# Patient Record
Sex: MALE | Race: WHITE | Employment: FULL TIME | URBAN - METROPOLITAN AREA
[De-identification: names, ages, dates, MRNs, and addresses within clinical notes are randomized per-mention and may not be internally consistent; named-entity substitution may affect disease eponyms.]

---

## 2021-04-08 ENCOUNTER — TRANSCRIBE ORDER (OUTPATIENT)
Dept: SCHEDULING | Age: 66
End: 2021-04-08

## 2021-04-08 DIAGNOSIS — M25.531 RIGHT WRIST PAIN: Primary | ICD-10-CM

## 2021-04-23 ENCOUNTER — HOSPITAL ENCOUNTER (OUTPATIENT)
Dept: MRI IMAGING | Age: 66
Discharge: HOME | End: 2021-04-23
Attending: SURGERY
Payer: MEDICARE

## 2021-04-23 DIAGNOSIS — M25.531 RIGHT WRIST PAIN: ICD-10-CM

## 2021-04-23 PROCEDURE — 73221 MRI JOINT UPR EXTREM W/O DYE: CPT

## 2021-06-10 ENCOUNTER — TRANSCRIBE ORDER (OUTPATIENT)
Dept: SCHEDULING | Age: 66
End: 2021-06-10

## 2021-06-10 DIAGNOSIS — M51.16 LUMBAR DISC DISEASE WITH RADICULOPATHY: ICD-10-CM

## 2021-06-10 DIAGNOSIS — Z98.1 ARTHRODESIS STATUS: Primary | ICD-10-CM

## 2021-06-10 DIAGNOSIS — M99.03 SOMATIC DYSFUNCTION OF LUMBAR REGION: ICD-10-CM

## 2021-06-10 DIAGNOSIS — M47.26 OTHER SPONDYLOSIS WITH RADICULOPATHY, LUMBAR REGION: ICD-10-CM

## 2021-06-10 DIAGNOSIS — M99.63 OSSEOUS AND SUBLUXATION STENOSIS OF INTERVERTEBRAL FORAMINA OF LUMBAR REGION: ICD-10-CM

## 2021-06-10 DIAGNOSIS — M62.830 BACK MUSCLE SPASM: ICD-10-CM

## 2021-06-15 ENCOUNTER — HOSPITAL ENCOUNTER (OUTPATIENT)
Dept: MRI IMAGING | Age: 66
Discharge: HOME | End: 2021-06-15
Payer: MEDICARE

## 2021-06-15 DIAGNOSIS — M51.16 LUMBAR DISC DISEASE WITH RADICULOPATHY: ICD-10-CM

## 2021-06-15 DIAGNOSIS — M99.63 OSSEOUS AND SUBLUXATION STENOSIS OF INTERVERTEBRAL FORAMINA OF LUMBAR REGION: ICD-10-CM

## 2021-06-15 DIAGNOSIS — M47.26 OTHER SPONDYLOSIS WITH RADICULOPATHY, LUMBAR REGION: ICD-10-CM

## 2021-06-15 DIAGNOSIS — M99.03 SOMATIC DYSFUNCTION OF LUMBAR REGION: ICD-10-CM

## 2021-06-15 DIAGNOSIS — Z98.1 ARTHRODESIS STATUS: ICD-10-CM

## 2021-06-15 DIAGNOSIS — M62.830 BACK MUSCLE SPASM: ICD-10-CM

## 2021-06-15 PROCEDURE — 72158 MRI LUMBAR SPINE W/O & W/DYE: CPT | Performed by: GENERAL PRACTICE

## 2021-06-15 RX ORDER — GADOTERATE MEGLUMINE 376.9 MG/ML
20 INJECTION INTRAVENOUS
Status: COMPLETED | OUTPATIENT
Start: 2021-06-15 | End: 2021-06-15

## 2021-06-15 RX ADMIN — GADOTERATE MEGLUMINE 20 ML: 376.9 INJECTION INTRAVENOUS at 09:00

## 2021-10-25 ENCOUNTER — TRANSCRIBE ORDER (OUTPATIENT)
Dept: SCHEDULING | Age: 66
End: 2021-10-25

## 2021-10-25 DIAGNOSIS — K11.8 SALIVARY GLAND OBSTRUCTION: Primary | ICD-10-CM

## 2021-11-04 ENCOUNTER — HOSPITAL ENCOUNTER (OUTPATIENT)
Dept: CT IMAGING | Age: 66
Discharge: HOME | End: 2021-11-04
Payer: MEDICARE

## 2021-11-04 DIAGNOSIS — K11.8 SALIVARY GLAND OBSTRUCTION: ICD-10-CM

## 2021-11-04 LAB — CREAT BLD-MCNC: 0.8 MG/DL (ref 0.6–1.3)

## 2021-11-04 PROCEDURE — 82565 ASSAY OF CREATININE: CPT

## 2021-11-04 PROCEDURE — 70491 CT SOFT TISSUE NECK W/DYE: CPT | Performed by: STUDENT IN AN ORGANIZED HEALTH CARE EDUCATION/TRAINING PROGRAM

## 2021-11-16 DIAGNOSIS — S46.012D TRAUMATIC ROTATOR CUFF TEAR, LEFT, SUBSEQUENT ENCOUNTER: Primary | ICD-10-CM

## 2021-11-20 DIAGNOSIS — S46.012D TRAUMATIC ROTATOR CUFF TEAR, LEFT, SUBSEQUENT ENCOUNTER: ICD-10-CM

## 2021-12-03 ENCOUNTER — OFFICE VISIT (OUTPATIENT)
Dept: ORTHOPEDIC SURGERY | Age: 66
End: 2021-12-03
Payer: MEDICARE

## 2021-12-03 VITALS — BODY MASS INDEX: 28.23 KG/M2 | HEIGHT: 74 IN | WEIGHT: 220 LBS

## 2021-12-03 DIAGNOSIS — M75.42 SHOULDER IMPINGEMENT, LEFT: ICD-10-CM

## 2021-12-03 DIAGNOSIS — S43.003A SUBLUXATION OF TENDON OF LONG HEAD OF BICEPS: ICD-10-CM

## 2021-12-03 DIAGNOSIS — G89.29 CHRONIC LEFT SHOULDER PAIN: Primary | ICD-10-CM

## 2021-12-03 DIAGNOSIS — S46.212D TEAR OF LEFT BICEPS MUSCLE, SUBSEQUENT ENCOUNTER: ICD-10-CM

## 2021-12-03 DIAGNOSIS — M19.012 ARTHRITIS OF LEFT ACROMIOCLAVICULAR JOINT: ICD-10-CM

## 2021-12-03 DIAGNOSIS — M25.512 CHRONIC LEFT SHOULDER PAIN: Primary | ICD-10-CM

## 2021-12-03 DIAGNOSIS — M75.122 NONTRAUMATIC COMPLETE TEAR OF LEFT ROTATOR CUFF: ICD-10-CM

## 2021-12-03 PROCEDURE — G8419 CALC BMI OUT NRM PARAM NOF/U: HCPCS | Performed by: ORTHOPAEDIC SURGERY

## 2021-12-03 PROCEDURE — G8427 DOCREV CUR MEDS BY ELIG CLIN: HCPCS | Performed by: ORTHOPAEDIC SURGERY

## 2021-12-03 PROCEDURE — 99214 OFFICE O/P EST MOD 30 MIN: CPT | Performed by: ORTHOPAEDIC SURGERY

## 2021-12-03 PROCEDURE — 1101F PT FALLS ASSESS-DOCD LE1/YR: CPT | Performed by: ORTHOPAEDIC SURGERY

## 2021-12-03 PROCEDURE — 3017F COLORECTAL CA SCREEN DOC REV: CPT | Performed by: ORTHOPAEDIC SURGERY

## 2021-12-03 PROCEDURE — G8432 DEP SCR NOT DOC, RNG: HCPCS | Performed by: ORTHOPAEDIC SURGERY

## 2021-12-03 PROCEDURE — G8536 NO DOC ELDER MAL SCRN: HCPCS | Performed by: ORTHOPAEDIC SURGERY

## 2021-12-03 NOTE — PROGRESS NOTES
Benita Rosado (: 1955) is a 77 y.o. male, patient, here for evaluation of the following chief complaint(s):  Shoulder Pain (left)       HPI:    He was last seen for his left shoulder pain on 2021. Since then, the patient did have an MRI performed on her left shoulder on 2021. The patient states that his pain levels the same as it was at his last visit. He rates the severity of his left shoulder pain is a 2 out of 10. He describes his pain is dull and intermittent. He reports taking no medication for his discomfort. Left shoulder MRI results: Small full-thickness tear of the anterior supraspinatus with 2.1 cm of retraction of mild atrophy. Diffuse high-grade partial-thickness undersurface tearing remaining supraspinatus. There is high-grade partial-thickness tearing of the superior subscapularis which allows the biceps long head tendon to sublux medially. The tendon is partially torn as it enters the bicipital groove. Tendinopathy with partial-thickness interstitial tear of the anterior infraspinatus. Moderate degeneration of the acromioclavicular joint. 9 x 12 mm full-thickness cartilage defect superior central humeral head. Not on File    No current outpatient medications on file. No current facility-administered medications for this visit. No past medical history on file. No past surgical history on file. No family history on file.      Social History     Socioeconomic History    Marital status:      Spouse name: Not on file    Number of children: Not on file    Years of education: Not on file    Highest education level: Not on file   Occupational History    Not on file   Tobacco Use    Smoking status: Not on file    Smokeless tobacco: Not on file   Substance and Sexual Activity    Alcohol use: Not on file    Drug use: Not on file    Sexual activity: Not on file   Other Topics Concern    Not on file   Social History Narrative    Not on file Social Determinants of Health     Financial Resource Strain:     Difficulty of Paying Living Expenses: Not on file   Food Insecurity:     Worried About Running Out of Food in the Last Year: Not on file    Yaya of Food in the Last Year: Not on file   Transportation Needs:     Lack of Transportation (Medical): Not on file    Lack of Transportation (Non-Medical): Not on file   Physical Activity:     Days of Exercise per Week: Not on file    Minutes of Exercise per Session: Not on file   Stress:     Feeling of Stress : Not on file   Social Connections:     Frequency of Communication with Friends and Family: Not on file    Frequency of Social Gatherings with Friends and Family: Not on file    Attends Sabianist Services: Not on file    Active Member of [address] [place] [address] [employer] or Organizations: Not on file    Attends Club or Organization Meetings: Not on file    Marital Status: Not on file   Intimate Partner Violence:     Fear of Current or Ex-Partner: Not on file    Emotionally Abused: Not on file    Physically Abused: Not on file    Sexually Abused: Not on file   Housing Stability:     Unable to Pay for Housing in the Last Year: Not on file    Number of [place] in the Last Year: Not on file    Unstable Housing in the Last Year: Not on file       Review of Systems   All other systems reviewed and are negative. Vitals:  Ht 6' 2\" (1.88 m)   Wt 220 lb (99.8 kg)   BMI 28.25 kg/m²    Body mass index is 28.25 kg/m². Ortho Exam     The patient is well-developed and well-nourished. The patient presents today in alert and oriented x3 with a normal mood and affect. The patient stands with a normal weightbearing line and walks with a normal gait. Left shoulder: No shoulder girdle atrophy. There is no soft tissue swelling, ecchymosis, abrasions, or lacerations.   Active range of motion of the shoulder is limited to 130 degrees of forward flexion, 120 degrees of lateral abduction, and 70 degrees of external rotation. Internal rotation is to the SI joint and is painful. Passive range of motion is full with a painful impingement sign and painful Russo sign. Rotator cuff strength is painful to evaluate and is a 4+/5 with forward flexion and lateral abduction. External rotation strength is maintained. There is no crepitation about the joint. Palpation of the [facility] joint does reproduce discomfort and there is pain elicited with cross body abduction. Strength of the extremity is 5/5 strength at biceps/triceps/wrist extension and is comparable to the contralateral side. DTRs are intact at +2/4 and are symmetrical.  Cervical range of motion is full with no pain to palpation along the paraspinal musculature medial border of the scapula. Spurling's sign is negative. ASSESSMENT/PLAN:      1. Chronic left shoulder pain  2. Shoulder impingement, left  3. Arthritis of left acromioclavicular joint  4. Subluxation of tendon of long head of biceps  5. Nontraumatic complete tear of left rotator cuff  6. Tear of left biceps muscle, subsequent encounter       Below is the assessment and plan developed based on review of pertinent history, physical exam, labs, studies, and medications. We discussed the patient's ongoing left shoulder pain and we reviewed his MRI images and results and they were consistent with a small full-thickness tear of the anterior supraspinatus with 2.1 cm of retraction of mild atrophy. Diffuse high-grade partial-thickness undersurface tearing remaining supraspinatus. There is high-grade partial-thickness tearing of the superior subscapularis which allows the biceps long head tendon to sublux medially. The tendon is partially torn as it enters the bicipital groove. Tendinopathy with partial-thickness interstitial tear of the anterior infraspinatus. Moderate degeneration of the acromioclavicular joint. 9 x 12 mm full-thickness cartilage defect superior central humeral head.   The possible treatment options were discussed with the patient and because of the duration of his increased pain, no improvement with multiple modalities of conservative management including an at-home exercise program, his physical exam, description of his pain, MRI images and results, past x-rays, and his inability to complete daily living activities without significant discomfort we both decided that surgical intervention was the best treatment plan. The risks and benefits of a left shoulder arthroscopic exam with rotator cuff repair, acromioplasty, distal clavicle resection, extensive debridement, and open biceps tenodesis were discussed in detail with the patient and he would like to proceed. We will schedule this at his convenience. In the interim, I did encourage him to ice when possible, modify his activity level based on his left shoulder pain, and use anti-inflammatory medication when necessary. The patient will also work on range of motion, strengthening, and stretching exercises with an at-home exercise program as pain tolerates. I will see him back in the office on an as-needed basis or on the day of his left shoulder surgery. Return for On the day of his left shoulder surgery. .    An electronic signature was used to authenticate this note.   -- Oniel Rodney MD

## 2021-12-28 DIAGNOSIS — M19.012 ARTHRITIS OF LEFT ACROMIOCLAVICULAR JOINT: Primary | ICD-10-CM

## 2021-12-28 RX ORDER — OXYCODONE AND ACETAMINOPHEN 5; 325 MG/1; MG/1
1 TABLET ORAL
Qty: 40 TABLET | Refills: 0 | Status: SHIPPED | OUTPATIENT
Start: 2021-12-28 | End: 2022-01-04

## 2022-01-04 ENCOUNTER — OFFICE VISIT (OUTPATIENT)
Dept: ORTHOPEDIC SURGERY | Age: 67
End: 2022-01-04
Payer: MEDICARE

## 2022-01-04 DIAGNOSIS — M25.512 PAIN OF LEFT SHOULDER REGION: ICD-10-CM

## 2022-01-04 DIAGNOSIS — Z98.890 STATUS POST SHOULDER SURGERY: Primary | ICD-10-CM

## 2022-01-04 PROCEDURE — 99024 POSTOP FOLLOW-UP VISIT: CPT | Performed by: ORTHOPAEDIC SURGERY

## 2022-01-04 NOTE — PROGRESS NOTES
Oumou Lee (: 1955) is a 77 y.o. male, patient, here for evaluation of the following chief complaint(s):  Surgical Follow-up and Shoulder Pain (left)       HPI:    He is now approaching 1 week status post left shoulder arthroscopic exam with rotator cuff repair, acromioplasty, distal clavicle resection, extensive debridement, and open biceps tenodesis. His surgery was performed on 2021. The patient states that his pain has improved since his surgical procedure. He describes his postoperative left shoulder pain as sharp and intermittent. He has been experiencing some swelling and bruising which is to be expected. The patient reports taking no medication for his discomfort. He has been wearing his postoperative immobilizer as instructed. Not on File    No current outpatient medications on file. No current facility-administered medications for this visit. History reviewed. No pertinent past medical history. History reviewed. No pertinent surgical history. History reviewed. No pertinent family history. Social History     Socioeconomic History    Marital status:      Spouse name: Not on file    Number of children: Not on file    Years of education: Not on file    Highest education level: Not on file   Occupational History    Not on file   Tobacco Use    Smoking status: Not on file    Smokeless tobacco: Not on file   Substance and Sexual Activity    Alcohol use: Not on file    Drug use: Not on file    Sexual activity: Not on file   Other Topics Concern    Not on file   Social History Narrative    Not on file     Social Determinants of Health     Financial Resource Strain:     Difficulty of Paying Living Expenses: Not on file   Food Insecurity:     Worried About Running Out of Food in the Last Year: Not on file    Yaya of Food in the Last Year: Not on file   Transportation Needs:     Lack of Transportation (Medical):  Not on file    Lack of Transportation (Non-Medical): Not on file   Physical Activity:     Days of Exercise per Week: Not on file    Minutes of Exercise per Session: Not on file   Stress:     Feeling of Stress : Not on file   Social Connections:     Frequency of Communication with Friends and Family: Not on file    Frequency of Social Gatherings with Friends and Family: Not on file    Attends Mandaeism Services: Not on file    Active Member of [address] [place] [address] or Organizations: Not on file    Attends Club or Organization Meetings: Not on file    Marital Status: Not on file   Intimate Partner Violence:     Fear of Current or Ex-Partner: Not on file    Emotionally Abused: Not on file    Physically Abused: Not on file    Sexually Abused: Not on file   Housing Stability:     Unable to Pay for Housing in the Last Year: Not on file    Number of [place] in the Last Year: Not on file    Unstable Housing in the Last Year: Not on file       Review of Systems   All other systems reviewed and are negative. Vitals:  Ht 6' 2\" (1.88 m)   Wt 220 lb (99.8 kg)   BMI 28.25 kg/m²    Body mass index is 28.25 kg/m². Ortho Exam     Left shoulder wounds are clean and dry neurovascular intact. There is some ecchymosis but no erythema. We did not test his active or passive range of motion except for his elbow motion he can achieve full extension and 120 degrees of flexion. He does have full wrist range of motion. His sensations are intact and his pulses are 2+. His skin is healing nicely. ASSESSMENT/PLAN:      1. Status post shoulder surgery  2. Pain of left shoulder region       Below is the assessment and plan developed based on review of pertinent history, physical exam, labs, studies, and medications. We discussed the patient's recent left shoulder arthroscopic exam with rotator cuff repair, acromioplasty, distal clavicle resection, extensive debridement, and open biceps tenodesis. His surgery was performed on 12/29/2021.   The patient is progressing nicely in the early stages of his recovery. He is having the appropriate amount of expected postoperative discomfort at this time. We did remove the patient stitches today in the office without complication. His incisions are healing nicely with no sign of irritation or infection and look normal.  He does have good early elbow range of motion. His shoulder range of motion and strength were not tested today. The patient will continue the postoperative protocol by remaining in his postoperative immobilizer as instructed. We did remove his abduction pillow today. He will work on passive only range of motion exercises with an at-home exercise program as pain tolerates. He is to avoid any active range of motion at this time. He is also to avoid any lifting with his left upper extremity, power biceps activities, internal and external supination, and reaching behind him with his arm fully extended. I did encourage him to ice when possible, modify his activity level based on his postoperative left shoulder and arm pain, and use anti-inflammatory medication when necessary. I will see him back in 2 weeks for reevaluation and further discussion of the postoperative protocol. Return in about 2 weeks (around 1/18/2022) for Reevaluation and further discussion of the postop protocol. An electronic signature was used to authenticate this note.   -- Vidhya Davis MD

## 2022-01-05 VITALS — WEIGHT: 220 LBS | BODY MASS INDEX: 28.23 KG/M2 | HEIGHT: 74 IN

## 2022-01-31 NOTE — PROGRESS NOTES
Debby Crain (: 1955) is a 77 y.o. male, patient, here for evaluation of the following chief complaint(s):  Surgical Follow-up and Shoulder Pain (right)       HPI:    He is now approaching 5 weeks status post left shoulder arthroscopic exam with rotator cuff repair, acromioplasty, distal clavicle resection, extensive debridement, and open biceps tenodesis. His surgery was performed on 2021. He was last seen on  2022. The patient states that his pain has improved since his last visit and surgical procedure. He rates the severity of his postoperative left shoulder pain as a 1 out of 10. He describes his pain as stabbing. His postoperative right shoulder pain does not wake him up from sleep at night. He reports taking no medication for his discomfort. The patient did present today not wearing his immobilizer and has been actively moving his shoulder which was not and is not advised at this time in his recovery. Not on File    No current outpatient medications on file. No current facility-administered medications for this visit. History reviewed. No pertinent past medical history. History reviewed. No pertinent surgical history. History reviewed. No pertinent family history.      Social History     Socioeconomic History    Marital status:      Spouse name: Not on file    Number of children: Not on file    Years of education: Not on file    Highest education level: Not on file   Occupational History    Not on file   Tobacco Use    Smoking status: Not on file    Smokeless tobacco: Not on file   Substance and Sexual Activity    Alcohol use: Not on file    Drug use: Not on file    Sexual activity: Not on file   Other Topics Concern    Not on file   Social History Narrative    Not on file     Social Determinants of Health     Financial Resource Strain:     Difficulty of Paying Living Expenses: Not on file   Food Insecurity:     Worried About Running Out of Food in the Last Year: Not on file    Ran Out of Food in the Last Year: Not on file   Transportation Needs:     Lack of Transportation (Medical): Not on file    Lack of Transportation (Non-Medical): Not on file   Physical Activity:     Days of Exercise per Week: Not on file    Minutes of Exercise per Session: Not on file   Stress:     Feeling of Stress : Not on file   Social Connections:     Frequency of Communication with Friends and Family: Not on file    Frequency of Social Gatherings with Friends and Family: Not on file    Attends Islam Services: Not on file    Active Member of [address] [place] [address] or Organizations: Not on file    Attends Club or Organization Meetings: Not on file    Marital Status: Not on file   Intimate Partner Violence:     Fear of Current or Ex-Partner: Not on file    Emotionally Abused: Not on file    Physically Abused: Not on file    Sexually Abused: Not on file   Housing Stability:     Unable to Pay for Housing in the Last Year: Not on file    Number of [place] in the Last Year: Not on file    Unstable Housing in the Last Year: Not on file       Review of Systems   All other systems reviewed and are negative. Vitals:  Ht 6' 2\" (1.88 m)   Wt 218 lb (98.9 kg)   BMI 27.99 kg/m²    Body mass index is 27.99 kg/m². Ortho Exam     The patient is well-developed and well-nourished. The patient presents today in alert and oriented x3 with a normal mood and affect. The patient stands with a normal weightbearing line and walks with a normal gait. Left shoulder wounds are clean and dry neurovascular intact. There is no ecchymosis or erythema. His incisions are well-healed with no sign of irritation or infection and look normal.  He does have full elbow and wrist range of motion. His passive shoulder range of motion continues to improve. He has approximately 130 degrees of passive forward flexion and 80 degrees of passive abduction.   He has been moving his shoulder actively which was not and is not advised at this time in his recovery and will discontinue this until the appropriate time. His strength was again not tested today. His sensations are intact his pulses are 2+. His skin is well-healed. ASSESSMENT/PLAN:      1. Pain of left shoulder region  2. Status post shoulder surgery  -     REFERRAL TO PHYSICAL THERAPY       Below is the assessment and plan developed based on review of pertinent history, physical exam, labs, studies, and medications. **The patient was referred to formal physical therapy. **    We discussed the patient's left shoulder arthroscopic exam with rotator cuff repair, acromioplasty, distal clavicle resection, extensive debridement, and open biceps tenodesis. His surgery was performed on 12/29/2021. He is now approaching 5 weeks status post surgical intervention. The patient continues to progress nicely in his recovery. His pain is intermittent and well controlled. His passive range of motion continues to improve nicely. He has been moving his shoulder actively which was not and is not advised at this time in his recovery. He will discontinue these activities until the appropriate time. His strength was again not tested today. He will continue the postoperative protocol by continuing to work on passive range of motion exercises. He will also at 6 weeks postop start working on active and active assist range of motion and strengthening exercise as at both formal physical therapy and with an at-home exercise program as pain tolerates. Until he is 6 weeks postop, the patient is to avoid any active range of motion. He is also still to be cautious of any significant lifting, power biceps activities, internal and external supination, and reaching behind him with his arm fully extended. He may discontinue use of his immobilizer at this time.   I did encourage him to ice when possible, modify his activity level based on his postoperative left shoulder and arm pain, and use anti-inflammatory medication when necessary. I will see him back in 3 weeks for reevaluation and further discussion of the postop protocol. Return in about 3 weeks (around 2/22/2022) for Re-evaluation and further discussion of the postop protocol. An electronic signature was used to authenticate this note.   -- Julianna Chaudhari MD

## 2022-02-01 ENCOUNTER — OFFICE VISIT (OUTPATIENT)
Dept: ORTHOPEDIC SURGERY | Age: 67
End: 2022-02-01
Payer: COMMERCIAL

## 2022-02-01 VITALS — BODY MASS INDEX: 27.98 KG/M2 | HEIGHT: 74 IN | WEIGHT: 218 LBS

## 2022-02-01 DIAGNOSIS — Z98.890 STATUS POST SHOULDER SURGERY: ICD-10-CM

## 2022-02-01 DIAGNOSIS — M25.512 PAIN OF LEFT SHOULDER REGION: Primary | ICD-10-CM

## 2022-02-01 PROCEDURE — 99024 POSTOP FOLLOW-UP VISIT: CPT | Performed by: ORTHOPAEDIC SURGERY

## 2022-02-09 ENCOUNTER — OFFICE VISIT (OUTPATIENT)
Dept: ORTHOPEDIC SURGERY | Age: 67
End: 2022-02-09
Payer: MEDICARE

## 2022-02-09 DIAGNOSIS — M25.512 PAIN OF LEFT SHOULDER REGION: Primary | ICD-10-CM

## 2022-02-09 DIAGNOSIS — M19.012 ARTHRITIS OF LEFT ACROMIOCLAVICULAR JOINT: ICD-10-CM

## 2022-02-09 PROCEDURE — 97162 PT EVAL MOD COMPLEX 30 MIN: CPT | Performed by: PHYSICAL THERAPIST

## 2022-02-09 PROCEDURE — 97110 THERAPEUTIC EXERCISES: CPT | Performed by: PHYSICAL THERAPIST

## 2022-02-09 NOTE — PROGRESS NOTES
Tatiana Cuello (: 1955) is a 77 y.o. Shoulder Pain       Patient Name: Tatiana Cuello  Date:2022  : 1955  [x]  Patient  Verified  [place]or: [place] / Plan: [place] MED[place]ARE PART A & B / Product Type: Medicare /    Total Treatment Time (min): 60 minutes  Total Timed Codes (min): 45 minutes  1:1 Treatment Time ( only): 45 minutes  Visit #:       Treatment Area: Left shoulder    ASSESSMENT/PLAN:  Below is the assessment and plan developed based on review of pertinent history, physical exam, labs, studies, and medications. Patient comes in today 6 weeks status post left rotator cuff repair and presents with physical therapy deficits including range of motion, strength, mobility, flexibility, and biomechanics. He will benefit from a physical therapy program to address above-mentioned deficits. Short-term goals: To become independent with today's prescribed home exercise program in 1 week. Long-term goals: To progress with a physical therapy program so patient demonstrates functional left shoulder range of motion and strength allowing him to place a 5 pound weight into overhead cabinet for up to 10 repetitions without shoulder pain or shoulder hiking in the next 8 to 12 weeks. Patient will be seen twice a week for up to 20 visits  with focus on progressive restoration of range of motion and strength, balance, and functional mobility. Therapeutic applications will include but are not limited to:Manual therapy, joint mobilization, myofascial release, therapeutic exercises. Modalities including ultrasound and electric stimulation heat and ice. Kinesiotape and Blue taping for joint reeducation and approximation of tissue for neuromuscular reeducation. 1. Pain of left shoulder region  2. Arthritis of left acromioclavicular joint      Return in about 5 days (around 2022).     SUBJECTIVE:  HPI  Patient comes in today status post left rotator cuff repair, acromioplasty, and bicep tenodesis on December 29, 2021. He reports improvement since surgery. He is not taking pain medication or anti-inflammatory. He has transitioned into a bed. Patient works as a lópez doing mostly trim work. He has not yet returned to work. Since his surgery he has started to notice some sciatica on his right hip and down his right thigh. Does have a history of 2 prior lumbar laminectomies. Patient would like to return to a regular gym program.  Past medical history includes left total hip replacement, left knee replacement, right wrist fusion, right shoulder rotator cuff repair, and hernia surgery. Please see patient's medical chart for detailed list of current medications as well as significant past medical history. OBJECTIVE:  Patient comes in today demonstrating a rounded shoulder posture. Cervical range of motion is pain-free and within functional limits. He is neurologically intact throughout bilateral upper extremities. Left shoulder: PROM measures 35 degrees external rotation, 50 degrees internal rotation, and 110 degrees of flexion. Patient is able to provide some resistance with external rotation in neutral position. Relative hypomobility to glenohumeral joint. Scapular dyskinesia. Flexibility restriction to subscap, pectoralis minor, teres group, and latissimus. Patient has atrophy in both supraspinatus and infraspinatus fossa. Mild subdermal adhesion around biceps surgical site. Elbow range of motion is within normal limits. He is able to actively flex and extend his elbow without pain. With today's interventions I performed gentle glenohumeral mobilization with passive range of motion. We initiated exercises for passive and active assistive range of motion as well as some scapular strengthening and ER isometrics for patient perform at home on a daily basis. Ice posttreatment. Today's total treatment time was for 60 minutes.     Shoulder pain and disability index    Total pain score 52%    Total disability score 62%    Total score 58%    Today's exercises include shoulder pulleys, shoulder flexion roll on table, scapular retraction, pendulums, supine PVC flexion, supine PVC external rotation stretch, standing PVC external rotation stretch, shoulder external rotation light isometrics. An electronic signature was used to authenticate this note.   -- Deepa Salcedo, PT

## 2022-02-15 ENCOUNTER — OFFICE VISIT (OUTPATIENT)
Dept: ORTHOPEDIC SURGERY | Age: 67
End: 2022-02-15
Payer: MEDICARE

## 2022-02-15 DIAGNOSIS — M19.012 ARTHRITIS OF LEFT ACROMIOCLAVICULAR JOINT: ICD-10-CM

## 2022-02-15 DIAGNOSIS — M25.512 PAIN OF LEFT SHOULDER REGION: Primary | ICD-10-CM

## 2022-02-15 PROCEDURE — 97110 THERAPEUTIC EXERCISES: CPT | Performed by: PHYSICAL THERAPIST

## 2022-02-15 PROCEDURE — 97140 MANUAL THERAPY 1/> REGIONS: CPT | Performed by: PHYSICAL THERAPIST

## 2022-02-15 NOTE — PROGRESS NOTES
Stephany Huizar (: 1955) is a 77 y.o. Shoulder Pain       Patient Name: Stephany Huizar  FBVM:  : 1955  [x]  Patient  Verified  Payor: Olivia Cummins / Plan: [place] MED[place]ARE PART A & B / Product Type: Medicare /    Total Treatment Time (min): 60 minutes  Total Timed Codes (min): 45 minutes  1:1 Treatment Time ( only): 45 minutes  Visit #: 2 of 25      Treatment Area: Left shoulder    ASSESSMENT/PLAN:  Below is the assessment and plan developed based on review of pertinent history, physical exam, labs, studies, and medications. Did well today with gravity assisted active range of motion as well as active assist progression with shoulder exercises. Continue with current plan of care progress towards long-term goals. 1. Pain of left shoulder region  2. Arthritis of left acromioclavicular joint      Return in about 2 days (around 2022) for continued therapy for shoulder. SUBJECTIVE:  HPI  Doing well. No new complaints. OBJECTIVE:  Glenohumeral mobilization with passive range of motion. Low load prolonged stretch with both left shoulder internal and external rotation. Passive release to teres and latissimus with overhead range of motion. Patient completed all exercises per chart. Passive shoulder flexion 120 degrees. Passive shoulder external rotation 45 degrees.   Passive shoulder internal rotation 50 degrees      PT Exercise Log         Activity/Exercise Date  02/15/22     Activity/Exercise  All exercises were supervised with verbal and tactile cues provided were necessary to perform the exercises with 100% accuracy       UBE []    Pulleys [x]       ER Band [x]  isowalk   IR Band     [x] 3x15   DAP Scaption     [x] 15 pounds   Clinger   []    Rows   [x] Scapular   Lat Pull down   []    Rooster walk   []    Scap depression   [x]    PNF 2   []    Supine MT   []      Supine circles/punches [x]  1 pound       PVC flexion [x]    Ys/Ts []    ER table stretch [x]    Viktoriya Myers extension   []    Pendulums   [x]    S/L abduction   []    Supine scaption   []        Behind the back belt stretch []        Sleeper stretch                  []                              []                            []       An electronic signature was used to authenticate this note.   -- David Noyola, PT

## 2022-02-18 ENCOUNTER — OFFICE VISIT (OUTPATIENT)
Dept: ORTHOPEDIC SURGERY | Age: 67
End: 2022-02-18
Payer: MEDICARE

## 2022-02-18 DIAGNOSIS — M75.42 SHOULDER IMPINGEMENT, LEFT: ICD-10-CM

## 2022-02-18 DIAGNOSIS — M25.512 PAIN OF LEFT SHOULDER REGION: Primary | ICD-10-CM

## 2022-02-18 PROCEDURE — 97140 MANUAL THERAPY 1/> REGIONS: CPT | Performed by: PHYSICAL THERAPIST

## 2022-02-18 PROCEDURE — 97110 THERAPEUTIC EXERCISES: CPT | Performed by: PHYSICAL THERAPIST

## 2022-02-18 NOTE — PROGRESS NOTES
René Blanc (: 1955) is a 77 y.o. Shoulder Pain       Patient Name: René Blanc  WOGT:  : 1955  [x]  Patient  Verified  Payor: Quentin Madrid / Plan: [place] MEDICARE PART A & B / Product Type: Medicare /    Total Treatment Time (min): 60 minutes  Total Timed Codes (min): 45 minutes  1:1 Treatment Time ( only): 45 minutes  Visit #: 3 of 25      Treatment Area: Left shoulder    ASSESSMENT/PLAN:  Below is the assessment and plan developed based on review of pertinent history, physical exam, labs, studies, and medications. Progressing nicely with AROM. Able to flex shoulder to 90 degrees with minimal substitution pattern. Continue with current plan of care progress towards long-term goals. 1. Pain of left shoulder region  2. Shoulder impingement, left      Return in about 5 days (around 2022). SUBJECTIVE:  HPI  Doing well. No new complaints. OBJECTIVE:  Manual(15 min)  Glenohumeral mobilization with passive range of motion. Low load prolonged stretch with both left shoulder internal and external rotation. Passive release to teres and latissimus with overhead range of motion. Passive shoulder flexion 125 degrees. Passive shoulder external rotation 55 degrees. Passive shoulder internal rotation 65 degrees. Behind the back stretch to L2. Exercise(30 min)  Patient completed all exercises per chart.       PT Exercise Log         Activity/Exercise Date  22     Activity/Exercise  All exercises were supervised with verbal and tactile cues provided were necessary to perform the exercises with 100% accuracy       UBE []    Pulleys [x]       ER Band [x]  yellow   IR Band     [x] 3x15   DAP Scaption     [x] 15 pounds   Clinger   [x]    Rows   [x] Scapular   Lat Pull down   []    Rooster walk   []    Scap depression   [x]    PNF 2   []    Supine MT   [x]      Supine circles/punches [x] 2 pound       PVC flexion [x]    Ys/Ts []    ER table stretch [x]    Kal Read extension   []    Pendulums   [x]    S/L abduction   [x]    Standing scaption   [x] 0#       Behind the back belt stretch [x]        Sleeper stretch                  []                              []                            []       An electronic signature was used to authenticate this note.   -- Deepa Salcedo, PT

## 2022-02-21 NOTE — PROGRESS NOTES
Papito Hensley (: 1955) is a 77 y.o. male, patient, here for evaluation of the following chief complaint(s):  Surgical Follow-up and Shoulder Pain (left)       HPI:    He is now 8 weeks status post left shoulder arthroscopic exam with rotator cuff repair, acromioplasty, distal clavicle resection, extensive debridement, and open biceps tenodesis.  His surgery was performed on 2021. He was last seen on 2022. The patient states that his pain has improved since his last visit and surgical procedure. He rates the severity of his postoperative left shoulder pain as a 1 out of 10. He describes his pain is aching and intermittent. His postoperative left shoulder pain does not wake him up from sleep at night. He reports taking no medication for his discomfort. The patient has been attending formal physical therapy postoperatively. He is also been working on these exercises with an at-home exercise program.  He reports that both the formal PT and home exercises have helped reduce his postoperative discomfort. Not on File    No current outpatient medications on file. No current facility-administered medications for this visit. History reviewed. No pertinent past medical history. History reviewed. No pertinent surgical history. History reviewed. No pertinent family history.      Social History     Socioeconomic History    Marital status:      Spouse name: Not on file    Number of children: Not on file    Years of education: Not on file    Highest education level: Not on file   Occupational History    Not on file   Tobacco Use    Smoking status: Not on file    Smokeless tobacco: Not on file   Substance and Sexual Activity    Alcohol use: Not on file    Drug use: Not on file    Sexual activity: Not on file   Other Topics Concern    Not on file   Social History Narrative    Not on file     Social Determinants of Health     Financial Resource Strain:     Difficulty of Paying Living Expenses: Not on file   Food Insecurity:     Worried About Running Out of Food in the Last Year: Not on file    Ran Out of Food in the Last Year: Not on file   Transportation Needs:     Lack of Transportation (Medical): Not on file    Lack of Transportation (Non-Medical): Not on file   Physical Activity:     Days of Exercise per Week: Not on file    Minutes of Exercise per Session: Not on file   Stress:     Feeling of Stress : Not on file   Social Connections:     Frequency of Communication with Friends and Family: Not on file    Frequency of Social Gatherings with Friends and Family: Not on file    Attends Holiness Services: Not on file    Active Member of [address] [place] [address] [employer] or Organizations: Not on file    Attends Club or Organization Meetings: Not on file    Marital Status: Not on file   Intimate Partner Violence:     Fear of Current or Ex-Partner: Not on file    Emotionally Abused: Not on file    Physically Abused: Not on file    Sexually Abused: Not on file   Housing Stability:     Unable to Pay for Housing in the Last Year: Not on file    Number of [place] in the Last Year: Not on file    Unstable Housing in the Last Year: Not on file       Review of Systems   All other systems reviewed and are negative. Vitals:  Ht 6' 1.5\" (1.867 m)   Wt 218 lb (98.9 kg)   BMI 28.37 kg/m²    Body mass index is 28.37 kg/m². Ortho Exam     The patient is well-developed and well-nourished. The patient presents today in alert and oriented x3 with a normal mood and affect. The patient stands with a normal weightbearing line and walks with a normal gait.     Left shoulder wounds are clean and dry neurovascular intact. There is no ecchymosis or erythema. His incisions are well-healed with no sign of irritation or infection and look normal.  He does have full elbow and wrist range of motion. His passive shoulder range of motion continues to improve.   He has essentially regained full passive range of motion. His active range of motion continues to improve nicely. He has approximately 160 degrees of active forward flexion and 90 degrees of active abduction. There is still some slight limitation in his active range of motion which is normal and to be expected. He is further along in his recovery at this point then normal.  His strength continues to improve. There is noticeable weakness compared to normal which is to be expected. His sensations are intact his pulses are 2+. His skin is well-healed. ASSESSMENT/PLAN:      1. Pain of left shoulder region  2. Status post shoulder surgery       Below is the assessment and plan developed based on review of pertinent history, physical exam, labs, studies, and medications. **The patient will continue attending formal physical therapy. **    We discussed the patient's left shoulder arthroscopic exam with rotator cuff repair, acromioplasty, distal clavicle resection, extensive debridement, and open biceps tenodesis.  His surgery was performed on 12/29/2021. He is now approximately 8 weeks status post surgical intervention. The patient continues to progress nicely in his recovery. His pain is minimal and well controlled. His range of motion and strength both continue to improve. The patient will continue postoperative protocol by working on range of motion, strengthening, and stretching exercises with both formal physical therapy and with an at-home exercise program as pain tolerates. He may continue to slowly increase activities as tolerated and as discussed today in the office. I did encourage him to ice when possible, modify his activity level based on his postoperative left shoulder pain, and use anti-inflammatory medication when necessary. I will see him back in 3 to 4 weeks for reevaluation and further discussion of the postoperative protocol.     Return in about 4 weeks (around 3/24/2022) for Reevaluation and further discussion of the postop protocol. An electronic signature was used to authenticate this note.   -- Nasreen Jaime MD

## 2022-02-22 ENCOUNTER — OFFICE VISIT (OUTPATIENT)
Dept: ORTHOPEDIC SURGERY | Age: 67
End: 2022-02-22
Payer: MEDICARE

## 2022-02-22 DIAGNOSIS — M75.42 SHOULDER IMPINGEMENT, LEFT: ICD-10-CM

## 2022-02-22 DIAGNOSIS — M25.512 PAIN OF LEFT SHOULDER REGION: Primary | ICD-10-CM

## 2022-02-22 PROCEDURE — 97110 THERAPEUTIC EXERCISES: CPT | Performed by: PHYSICAL THERAPIST

## 2022-02-22 NOTE — PROGRESS NOTES
Lynsey Fields (: 1955) is a 77 y.o. Shoulder Pain       Patient Name: Lynsey Fields  Date:2022  : 1955  [x]  Patient  Verified  Payor: Heatherjeffy Damien / Plan: [place] MEDICARE PART A & B / Product Type: Medicare /    Total Treatment Time (min): 60 minutes  Total Timed Codes (min): 45 minutes  1:1 Treatment Time ( only): 45 minutes  Visit #:  of 25      Treatment Area: Left shoulder    ASSESSMENT/PLAN:  Below is the assessment and plan developed based on review of pertinent history, physical exam, labs, studies, and medications. Improved rotator cuff and scapular control with active shoulder flexion. Still some capsular restriction with overhead passive motion. Continue with current plan of care progress towards long-term goals. 1. Pain of left shoulder region  2. Shoulder impingement, left      Return in about 2 days (around 2022) for continued therapy for shoulder. SUBJECTIVE:  HPI  Doing well. No new complaints. OBJECTIVE:  Manual(10 mins)  Glenohumeral mobilization with passive range of motion. Low load prolonged stretch with both left shoulder internal and external rotation. Passive release to teres and latissimus with overhead range of motion. Patient completed all exercises per chart. Passive shoulder flexion 125 degrees. Passive shoulder external rotation 55 degrees. Passive shoulder internal rotation 65 degrees. Behind the back stretch to L2.     Exercises(40 mins)  See below      PT Exercise Log         Activity/Exercise Date  22     Activity/Exercise  All exercises were supervised with verbal and tactile cues provided were necessary to perform the exercises with 100% accuracy       UBE [x]    Pulleys [x]       ER Band [x]  yellow   IR Band     [x] 3x15   DAP Scaption     [x] 15 pounds   Clinger   [x]    Rows   [x] Scapular   Lat Pull down   []    Rooster walk   []    Scap depression   [x]    PNF 2   []    Supine MT   [x]      Supine circles/punches [x] 2 pound       PVC flexion [x]    Ys/Ts [x]    ER table stretch [x]    Troll extension   []    Pendulums   [x]    S/L abduction   [x]    Standing scaption   [x] 0#       Behind the back belt stretch [x]        Sleeper stretch                  []                              []                            []       An electronic signature was used to authenticate this note.   -- Tammy Macias, PT

## 2022-02-24 ENCOUNTER — OFFICE VISIT (OUTPATIENT)
Dept: ORTHOPEDIC SURGERY | Age: 67
End: 2022-02-24
Payer: MEDICARE

## 2022-02-24 ENCOUNTER — OFFICE VISIT (OUTPATIENT)
Dept: ORTHOPEDIC SURGERY | Age: 67
End: 2022-02-24

## 2022-02-24 VITALS — WEIGHT: 218 LBS | BODY MASS INDEX: 27.98 KG/M2 | HEIGHT: 74 IN

## 2022-02-24 DIAGNOSIS — M25.512 PAIN OF LEFT SHOULDER REGION: Primary | ICD-10-CM

## 2022-02-24 DIAGNOSIS — M75.42 SHOULDER IMPINGEMENT, LEFT: ICD-10-CM

## 2022-02-24 DIAGNOSIS — Z98.890 STATUS POST SHOULDER SURGERY: ICD-10-CM

## 2022-02-24 PROCEDURE — 97140 MANUAL THERAPY 1/> REGIONS: CPT | Performed by: PHYSICAL THERAPIST

## 2022-02-24 PROCEDURE — 99024 POSTOP FOLLOW-UP VISIT: CPT | Performed by: ORTHOPAEDIC SURGERY

## 2022-02-24 PROCEDURE — 97110 THERAPEUTIC EXERCISES: CPT | Performed by: PHYSICAL THERAPIST

## 2022-02-24 NOTE — PROGRESS NOTES
Stephany Huizar (: 1955) is a 77 y.o. Shoulder Pain       Patient Name: Stephany Huizar  Date:2022  : 1955  [x]  Patient  Verified  Payor: Olivia Cummins / Plan: [place] MEDICARE PART A & B / Product Type: Medicare /    Total Treatment Time (min): 60 minutes  Total Timed Codes (min): 45 minutes  1:1 Treatment Time ( only): 45 minutes  Visit #: 5 of 25  Referring MD: [address] [place] [address] Road: Left shoulder    ASSESSMENT/PLAN:  Below is the assessment and plan developed based on review of pertinent history, physical exam, labs, studies, and medications. Progressing with functional shoulder rom and strength. Continue with current plan of care progress towards long-term goals. 1. Pain of left shoulder region  2. Shoulder impingement, left      Return in about 4 days (around 2022) for continued therapy for shoulder. SUBJECTIVE:  Shoulder Pain      No new complaints. Able to us shoulder more for ADLs. OBJECTIVE:  Manual(15 mins)  Glenohumeral mobilization with passive range of motion. Soft tissue massage to infraspinatus fossa. Low load prolonged stretch with both left shoulder internal and external rotation. Passive release to teres and latissimus with overhead range of motion. Patient completed all exercises per chart. Passive shoulder flexion 125 degrees. Passive shoulder external rotation 65 degrees. Passive shoulder internal rotation 70 degrees. Behind the back stretch to L1.     Exercise(30 mins)      PT Exercise Log         Activity/Exercise Date  22     Activity/Exercise  All exercises were supervised with verbal and tactile cues provided were necessary to perform the exercises with 100% accuracy       UBE [x]    Pulleys [x]       ER Band [x]  yellow   IR Band     [x] 3x15   DAP Scaption     [x] 15 pounds   Clinger   [x]    Rows   [x] Scapular   Lat Pull down   []    Rooster walk   []    Scap depression   [x]    PNF 2   []    Supine MT   [x]      Supine circles/punches [x] 2 pound       PVC flexion [x]    Ys/Ts [x]    ER table stretch [x]    Troll extension   []    Pendulums   [x]    S/L abduction   [x]    Standing scaption   [x] 0#       Behind the back belt stretch [x]        Sleeper stretch                  []                              []                            []       An electronic signature was used to authenticate this note.   -- Rebeca Copeland, PT

## 2022-03-01 ENCOUNTER — OFFICE VISIT (OUTPATIENT)
Dept: ORTHOPEDIC SURGERY | Age: 67
End: 2022-03-01
Payer: MEDICARE

## 2022-03-01 DIAGNOSIS — M25.512 PAIN OF LEFT SHOULDER REGION: Primary | ICD-10-CM

## 2022-03-01 DIAGNOSIS — M75.42 SHOULDER IMPINGEMENT, LEFT: ICD-10-CM

## 2022-03-01 PROCEDURE — 97110 THERAPEUTIC EXERCISES: CPT | Performed by: PHYSICAL THERAPIST

## 2022-03-01 PROCEDURE — 97140 MANUAL THERAPY 1/> REGIONS: CPT | Performed by: PHYSICAL THERAPIST

## 2022-03-02 NOTE — PROGRESS NOTES
Zahra Rich (: 1955) is a 77 y.o. Shoulder Pain       Patient Name: Zahra Rich  : 1955  [x]  Patient  Verified  Payor: VA MEDICARE / Plan: VA MEDICARE PART A & B / Product Type: Medicare /    Total Treatment Time (min): 60 minutes  Total Timed Codes (min): 45 minutes  1:1 Treatment Time ( only): 45 minutes  Visit #: 6 of 25  Referring MD: [address]: Left shoulder    ASSESSMENT/PLAN:  Below is the assessment and plan developed based on review of pertinent history, physical exam, labs, studies, and medications. Restricted with behind the back reach. Needs to work on [employer]. Continue with current plan of care progress towards long-term goals. 1. Pain of left shoulder region  2. Shoulder impingement, left      Return in about 2 days (around 3/3/2022) for continued therapy for shoulder. SUBJECTIVE:  Shoulder Pain      No new complaints. OBJECTIVE:  Manual(15 mins)  Glenohumeral mobilization with passive range of motion. Soft tissue massage to infraspinatus fossa. Low load prolonged stretch with both left shoulder internal and external rotation. Passive release to teres and latissimus with overhead range of motion. Patient completed all exercises per chart. Passive shoulder flexion 130 degrees. Passive shoulder external rotation 65 degrees. Passive shoulder internal rotation 70 degrees. Behind the back stretch to L1.     Exercise(30 mins)See below      PT Exercise Log         Activity/Exercise Date  22     Activity/Exercise  All exercises were supervised with verbal and tactile cues provided were necessary to perform the exercises with 100% accuracy       UBE [x]    Pulleys [x]       ER Band [x]  yellow   IR Band     [x] 3x15   DAP Scaption     [x] 15 pounds   Clinger   [x]    Rows   [x] Scapular   Lat Pull down   [x]    Rooster walk   []    Scap depression   [x]    PNF 2   []    Supine MT   [x]      Supine circles/punches [x] 2 pound       PVC flexion [x]    Ys/Ts [x]    ER table stretch [x]    Troll extension   []    Pendulums   [x]    S/L abduction   [x]    Standing scaption   [x] 0#       Behind the back belt stretch [x]        Table dip                  [x]                              []                            []       An electronic signature was used to authenticate this note.   -- Jo Bailey, PT

## 2022-03-03 ENCOUNTER — OFFICE VISIT (OUTPATIENT)
Dept: ORTHOPEDIC SURGERY | Age: 67
End: 2022-03-03
Payer: MEDICARE

## 2022-03-03 DIAGNOSIS — M75.42 SHOULDER IMPINGEMENT, LEFT: ICD-10-CM

## 2022-03-03 DIAGNOSIS — M25.512 PAIN OF LEFT SHOULDER REGION: Primary | ICD-10-CM

## 2022-03-03 PROCEDURE — 97140 MANUAL THERAPY 1/> REGIONS: CPT | Performed by: PHYSICAL THERAPIST

## 2022-03-03 PROCEDURE — 97110 THERAPEUTIC EXERCISES: CPT | Performed by: PHYSICAL THERAPIST

## 2022-03-03 NOTE — PROGRESS NOTES
Ilya Dempsey (: 1955) is a 77 y.o. Shoulder Pain       Patient Name: Ilya Dempsey  : 1955  [x]  Patient  Verified  Payor: VA MEDICARE / Plan: VA MEDICARE PART A & B / Product Type: Medicare /    Total Treatment Time (min): 60 minutes  Total Timed Codes (min): 45 minutes  1:1 Treatment Time ( only): 45 minutes  Visit #: 8 of 25  Referring MD: [address]: Left shoulder    ASSESSMENT/PLAN:  Below is the assessment and plan developed based on review of pertinent history, physical exam, labs, studies, and medications. Better with reaching behind his back and rc strength away from neutral.  Continue with current plan of care progress towards long-term goals. 1. Pain of left shoulder region  2. Shoulder impingement, left      No follow-ups on file. SUBJECTIVE:  Shoulder Pain      No new complaints. OBJECTIVE:  Manual(15 mins)  Glenohumeral mobilization with passive range of motion. Soft tissue massage to infraspinatus fossa. Low load prolonged stretch with both left shoulder internal and external rotation. Passive release to teres and latissimus with overhead range of motion. Patient completed all exercises per chart. Passive shoulder flexion 145 degrees. Passive shoulder external rotation 65 degrees. Passive shoulder internal rotation 70 degrees. Behind the back stretch to T10.     Exercise(30 mins)See below      PT Exercise Log         Activity/Exercise Date  22     Activity/Exercise  All exercises were supervised with verbal and tactile cues provided were necessary to perform the exercises with 100% accuracy       UBE [x]    Pulleys [x]       ER Band [x]  yellow   IR Band     [x] 3x15   DAP Scaption     [x] 15 pounds   Clinger   [x]    Rows   [x] Scapular   Lat Pull down   [x]    Rooster walk   []    Scap depression   [x]    PNF 2   [x]    Supine MT   [x]      Supine circles/punches [x] 2 pound       PVC flexion [x]    Ys/Ts [x]    ER table stretch [x] Troll extension   []    Pendulums   [x]    S/L abduction   [x]    Standing scaption   [x] 0#       Behind the back belt stretch [x]        Table dip                  [x]            Supine scaption                  [x]     Red                       []       An electronic signature was used to authenticate this note.   -- Marc Baumann, PT

## 2022-03-24 ENCOUNTER — OFFICE VISIT (OUTPATIENT)
Dept: ORTHOPEDIC SURGERY | Age: 67
End: 2022-03-24
Payer: MEDICARE

## 2022-03-24 VITALS — BODY MASS INDEX: 27.98 KG/M2 | HEIGHT: 74 IN | WEIGHT: 218 LBS

## 2022-03-24 DIAGNOSIS — M47.816 LUMBAR SPONDYLOSIS: ICD-10-CM

## 2022-03-24 DIAGNOSIS — M54.50 CHRONIC BILATERAL LOW BACK PAIN WITHOUT SCIATICA: ICD-10-CM

## 2022-03-24 DIAGNOSIS — M25.551 RIGHT HIP PAIN: Primary | ICD-10-CM

## 2022-03-24 DIAGNOSIS — G89.29 CHRONIC BILATERAL LOW BACK PAIN WITHOUT SCIATICA: ICD-10-CM

## 2022-03-24 PROCEDURE — 3017F COLORECTAL CA SCREEN DOC REV: CPT | Performed by: ORTHOPAEDIC SURGERY

## 2022-03-24 PROCEDURE — 1101F PT FALLS ASSESS-DOCD LE1/YR: CPT | Performed by: ORTHOPAEDIC SURGERY

## 2022-03-24 PROCEDURE — 99213 OFFICE O/P EST LOW 20 MIN: CPT | Performed by: ORTHOPAEDIC SURGERY

## 2022-03-24 PROCEDURE — G8432 DEP SCR NOT DOC, RNG: HCPCS | Performed by: ORTHOPAEDIC SURGERY

## 2022-03-24 PROCEDURE — G8536 NO DOC ELDER MAL SCRN: HCPCS | Performed by: ORTHOPAEDIC SURGERY

## 2022-03-24 PROCEDURE — G8427 DOCREV CUR MEDS BY ELIG CLIN: HCPCS | Performed by: ORTHOPAEDIC SURGERY

## 2022-03-24 PROCEDURE — G8419 CALC BMI OUT NRM PARAM NOF/U: HCPCS | Performed by: ORTHOPAEDIC SURGERY

## 2022-03-24 RX ORDER — AMLODIPINE BESYLATE 10 MG/1
10 TABLET ORAL DAILY
COMMUNITY
Start: 2022-02-19

## 2022-03-24 RX ORDER — LOSARTAN POTASSIUM AND HYDROCHLOROTHIAZIDE 12.5; 1 MG/1; MG/1
TABLET ORAL
COMMUNITY
Start: 2022-03-17

## 2022-03-24 NOTE — PROGRESS NOTES
Marylou Sheth (: 1955) is a 77 y.o. male, patient, here for evaluation of the following chief complaint(s):  Hip Pain (right hip discomfort )       HPI:    Patient's chief complaint is low back pain. Wanted to make sure that his hip was in good condition. He has had a left hip replacement. No Known Allergies    Current Outpatient Medications   Medication Sig    amLODIPine (NORVASC) 10 mg tablet Take 10 mg by mouth daily.  losartan-hydroCHLOROthiazide (HYZAAR) 100-12.5 mg per tablet      No current facility-administered medications for this visit. Past Medical History:   Diagnosis Date    Hx of laminectomy     Hx of total hip arthroplasty     left hip     Hx of total knee arthroplasty     left knee     Hypertension         Past Surgical History:   Procedure Laterality Date    HX ANKLE FRACTURE TX      HX HIP REPLACEMENT      HX KNEE ARTHROSCOPY      HX KNEE REPLACEMENT      DE LAMINECTOMY,CERVICAL         History reviewed. No pertinent family history. Social History     Socioeconomic History    Marital status:      Spouse name: Not on file    Number of children: Not on file    Years of education: Not on file    Highest education level: Not on file   Occupational History    Not on file   Tobacco Use    Smoking status: Never Smoker    Smokeless tobacco: Never Used   Substance and Sexual Activity    Alcohol use: Not on file     Comment: ocassionally     Drug use: Not on file    Sexual activity: Not on file   Other Topics Concern    Not on file   Social History Narrative    Not on file     Social Determinants of Health     Financial Resource Strain:     Difficulty of Paying Living Expenses: Not on file   Food Insecurity:     Worried About Running Out of Food in the Last Year: Not on file    Yaya of Food in the Last Year: Not on file   Transportation Needs:     Lack of Transportation (Medical): Not on file    Lack of Transportation (Non-Medical):  Not on file Physical Activity:     Days of Exercise per Week: Not on file    Minutes of Exercise per Session: Not on file   Stress:     Feeling of Stress : Not on file   Social Connections:     Frequency of Communication with Friends and Family: Not on file    Frequency of Social Gatherings with Friends and Family: Not on file    Attends Nondenominational Services: Not on file    Active Member of Clubs or Organizations: Not on file    Attends Club or Organization Meetings: Not on file    Marital Status: Not on file   Intimate Partner Violence:     Fear of Current or Ex-Partner: Not on file    Emotionally Abused: Not on file    Physically Abused: Not on file    Sexually Abused: Not on file   Housing Stability:     Unable to Pay for Housing in the Last Year: Not on file    Number of [place] in the Last Year: Not on file    Unstable Housing in the Last Year: Not on file       ROS     Positive for: Musculoskeletal    Last edited by Jun Escalante on 3/24/2022  4:45 PM. (History)            Vitals:  Ht 6' 1.5\" (1.867 m)   Wt 218 lb (98.9 kg)   BMI 28.37 kg/m²    Body mass index is 28.37 kg/m². PHYSICAL EXAM:  Right hip exam reveals no irritability when I flex his hip and internally rotated. Logroll test negative. Does have slight diminished internal rotation of his right hip. No provocative maneuver on his right hip elicits pain. He has some mild trochanteric tenderness. IMAGING:  XR Results (most recent):  Results from Appointment encounter on 03/24/22    XR PELV 1 OR 2 V    Narrative  X-ray of patient's pelvis was obtained. Left total hip is in position and looks great. The right hip joint space remains well-maintained no acute issues are noted. Evidence of prior lumbar laminectomy noted. ASSESSMENT/PLAN:  1. Right hip pain  2. Chronic bilateral low back pain without sciatica  3. Lumbar spondylosis    Patient has maintained hip joint would not recommend any further interventions on his hip.   I reviewed his lumbar MRI and visualized portion of his lumbar spine on his x-rays today and this is likely the cause of his discomfort. He is really not that limited by the symptoms. He could try several things with his back he could return to his neurosurgeon and consider a lumbar injection. He could also have a lumbar manipulation from Dr. Dora Knight. Hip joint again is adequately intact and would not recommend surgery on it now. His left total hip looks great. An electronic signature was used to authenticate this note.   --Chelsy Romero MD

## 2022-03-24 NOTE — PROGRESS NOTES
Rebeca Ware (: 1955) is a 77 y.o. male, patient, here for evaluation of the following chief complaint(s):  Surgical Follow-up (left) and Shoulder Pain       HPI:    He is now approximately 12-1/2 weeks status post left shoulder arthroscopic exam with rotator cuff repair, acromioplasty, distal clavicle resection, extensive debridement, and open biceps tenodesis.  His surgery was performed on 2021. He was last seen on 2022. The patient states that his pain level has improved since his last visit and surgical procedure. He describes his left shoulder pain as aching and intermittent. His left shoulder pain does not wake him up from sleep at night. He reports taking no medication for his discomfort. The patient has been working on range of motion, strengthening, and stretching exercises with both formal physical therapy and an at-home exercise program.    No Known Allergies    Current Outpatient Medications   Medication Sig    amLODIPine (NORVASC) 10 mg tablet Take 10 mg by mouth daily.  losartan-hydroCHLOROthiazide (HYZAAR) 100-12.5 mg per tablet      No current facility-administered medications for this visit. Past Medical History:   Diagnosis Date    Hx of laminectomy     Hx of total hip arthroplasty     left hip     Hx of total knee arthroplasty     left knee     Hypertension         Past Surgical History:   Procedure Laterality Date    HX ANKLE FRACTURE TX      HX HIP REPLACEMENT      HX KNEE ARTHROSCOPY      HX KNEE REPLACEMENT      VA LAMINECTOMY,CERVICAL         History reviewed. No pertinent family history.      Social History     Socioeconomic History    Marital status:      Spouse name: Not on file    Number of children: Not on file    Years of education: Not on file    Highest education level: Not on file   Occupational History    Not on file   Tobacco Use    Smoking status: Never Smoker    Smokeless tobacco: Never Used   Substance and Sexual Activity    Alcohol use: Not on file     Comment: ocassionally     Drug use: Not on file    Sexual activity: Not on file   Other Topics Concern    Not on file   Social History Narrative    Not on file     Social Determinants of Health     Financial Resource Strain:     Difficulty of Paying Living Expenses: Not on file   Food Insecurity:     Worried About Running Out of Food in the Last Year: Not on file    Yaya of Food in the Last Year: Not on file   Transportation Needs:     Lack of Transportation (Medical): Not on file    Lack of Transportation (Non-Medical): Not on file   Physical Activity:     Days of Exercise per Week: Not on file    Minutes of Exercise per Session: Not on file   Stress:     Feeling of Stress : Not on file   Social Connections:     Frequency of Communication with Friends and Family: Not on file    Frequency of Social Gatherings with Friends and Family: Not on file    Attends [employer] Services: Not on file    Active Member of [address] [place] [address] or Organizations: Not on file    Attends Club or Organization Meetings: Not on file    Marital Status: Not on file   Intimate Partner Violence:     Fear of Current or Ex-Partner: Not on file    Emotionally Abused: Not on file    Physically Abused: Not on file    Sexually Abused: Not on file   Housing Stability:     Unable to Pay for Housing in the Last Year: Not on file    Number of [place] in the Last Year: Not on file    Unstable Housing in the Last Year: Not on file       Review of Systems   All other systems reviewed and are negative. Vitals:  Ht 6' 1.5\" (1.867 m)   Wt 218 lb (98.9 kg)   BMI 28.37 kg/m²    Body mass index is 28.37 kg/m².     Ortho Exam     The patient is well-developed and well-nourished.  The patient presents today in alert and oriented x3 with a normal mood and affect.  The patient stands with a normal weightbearing line and walks with a normal gait.     Left shoulder wounds are clean and dry neurovascular intact.  There is no ecchymosis or erythema.  His incisions are well-healed with no sign of irritation or infection and look normal.  He does have full elbow and wrist range of motion.  He has essentially regained full range of motion. His strength continues to improve. There is weakness compared to normal which is to be expected.  His sensations are intact his pulses are 2+.  His skin is well-healed. ASSESSMENT/PLAN:      1. Pain of left shoulder region  2. Status post shoulder surgery  -     REFERRAL TO PHYSICAL THERAPY       Below is the assessment and plan developed based on review of pertinent history, physical exam, labs, studies, and medications. We discussed the patient's left shoulder arthroscopic exam with rotator cuff repair, acromioplasty, distal clavicle resection, extensive debridement, and open biceps tenodesis.  His surgery was performed on 12/29/2021. He is now approximately 12-1/2 weeks status post surgical intervention. The patient continues to progress nicely in his recovery. His pain is minimal and well controlled. He has essentially regained his range of motion and his strength continues to improve. The patient will continue postoperative protocol by working on range of motion, strengthening, and stretching exercises with both formal physical therapy and with an at-home exercise program as pain tolerates. He may continue to slowly increase activities as tolerated and as discussed today in the office. I did encourage him to ice when possible, modify his activity level based on his postoperative left shoulder pain, and use anti-inflammatory medication when necessary. I will see him back in 3 to 4 weeks for reevaluation and further discussion of the postoperative protocol. Return in about 4 weeks (around 4/25/2022), or if symptoms worsen or fail to improve. An electronic signature was used to authenticate this note.   -- Buck Anna MD

## 2022-03-28 ENCOUNTER — OFFICE VISIT (OUTPATIENT)
Dept: ORTHOPEDIC SURGERY | Age: 67
End: 2022-03-28
Payer: MEDICARE

## 2022-03-28 VITALS — WEIGHT: 218 LBS | BODY MASS INDEX: 27.98 KG/M2 | HEIGHT: 74 IN

## 2022-03-28 DIAGNOSIS — M25.512 PAIN OF LEFT SHOULDER REGION: Primary | ICD-10-CM

## 2022-03-28 DIAGNOSIS — Z98.890 STATUS POST SHOULDER SURGERY: ICD-10-CM

## 2022-03-28 PROCEDURE — 99024 POSTOP FOLLOW-UP VISIT: CPT | Performed by: ORTHOPAEDIC SURGERY
